# Patient Record
Sex: MALE | Race: BLACK OR AFRICAN AMERICAN | NOT HISPANIC OR LATINO | URBAN - METROPOLITAN AREA
[De-identification: names, ages, dates, MRNs, and addresses within clinical notes are randomized per-mention and may not be internally consistent; named-entity substitution may affect disease eponyms.]

---

## 2023-05-02 ENCOUNTER — OFFICE (AMBULATORY)
Dept: URBAN - METROPOLITAN AREA CLINIC 76 | Facility: CLINIC | Age: 48
End: 2023-05-02

## 2023-05-02 VITALS
OXYGEN SATURATION: 98 % | HEART RATE: 78 BPM | HEIGHT: 68 IN | SYSTOLIC BLOOD PRESSURE: 130 MMHG | WEIGHT: 224 LBS | DIASTOLIC BLOOD PRESSURE: 76 MMHG

## 2023-05-02 DIAGNOSIS — F11.90 OPIOID USE, UNSPECIFIED, UNCOMPLICATED: ICD-10-CM

## 2023-05-02 DIAGNOSIS — Z86.010 PERSONAL HISTORY OF COLONIC POLYPS: ICD-10-CM

## 2023-05-02 DIAGNOSIS — R14.0 ABDOMINAL DISTENSION (GASEOUS): ICD-10-CM

## 2023-05-02 DIAGNOSIS — R12 HEARTBURN: ICD-10-CM

## 2023-05-02 PROCEDURE — 99204 OFFICE O/P NEW MOD 45 MIN: CPT | Performed by: INTERNAL MEDICINE

## 2023-05-10 VITALS
DIASTOLIC BLOOD PRESSURE: 73 MMHG | HEART RATE: 89 BPM | SYSTOLIC BLOOD PRESSURE: 150 MMHG | HEART RATE: 87 BPM | TEMPERATURE: 96.8 F | HEIGHT: 68 IN | SYSTOLIC BLOOD PRESSURE: 108 MMHG | DIASTOLIC BLOOD PRESSURE: 98 MMHG | HEART RATE: 84 BPM | HEART RATE: 88 BPM | TEMPERATURE: 97.5 F | SYSTOLIC BLOOD PRESSURE: 140 MMHG | RESPIRATION RATE: 23 BRPM | OXYGEN SATURATION: 98 % | HEART RATE: 85 BPM | RESPIRATION RATE: 15 BRPM | SYSTOLIC BLOOD PRESSURE: 114 MMHG | TEMPERATURE: 96.9 F | WEIGHT: 224 LBS | HEART RATE: 86 BPM | SYSTOLIC BLOOD PRESSURE: 118 MMHG | OXYGEN SATURATION: 99 % | SYSTOLIC BLOOD PRESSURE: 139 MMHG | SYSTOLIC BLOOD PRESSURE: 125 MMHG | RESPIRATION RATE: 20 BRPM | DIASTOLIC BLOOD PRESSURE: 78 MMHG | DIASTOLIC BLOOD PRESSURE: 99 MMHG | SYSTOLIC BLOOD PRESSURE: 113 MMHG | DIASTOLIC BLOOD PRESSURE: 67 MMHG | DIASTOLIC BLOOD PRESSURE: 76 MMHG | DIASTOLIC BLOOD PRESSURE: 72 MMHG | DIASTOLIC BLOOD PRESSURE: 71 MMHG | DIASTOLIC BLOOD PRESSURE: 80 MMHG | RESPIRATION RATE: 17 BRPM | DIASTOLIC BLOOD PRESSURE: 93 MMHG | SYSTOLIC BLOOD PRESSURE: 116 MMHG

## 2023-05-12 ENCOUNTER — AMBULATORY SURGICAL CENTER (AMBULATORY)
Dept: URBAN - METROPOLITAN AREA SURGERY 17 | Facility: SURGERY | Age: 48
End: 2023-05-12

## 2023-05-12 ENCOUNTER — OFFICE (AMBULATORY)
Dept: URBAN - METROPOLITAN AREA PATHOLOGY 4 | Facility: PATHOLOGY | Age: 48
End: 2023-05-12
Payer: COMMERCIAL

## 2023-05-12 DIAGNOSIS — D12.4 BENIGN NEOPLASM OF DESCENDING COLON: ICD-10-CM

## 2023-05-12 DIAGNOSIS — Z86.010 PERSONAL HISTORY OF COLONIC POLYPS: ICD-10-CM

## 2023-05-12 PROBLEM — K63.5 POLYP OF COLON: Status: ACTIVE | Noted: 2023-05-12

## 2023-05-12 LAB
GI HISTOLOGY: A. UNSPECIFIED: (no result)
GI HISTOLOGY: PDF REPORT: (no result)

## 2023-05-12 PROCEDURE — 45385 COLONOSCOPY W/LESION REMOVAL: CPT | Mod: 33 | Performed by: INTERNAL MEDICINE

## 2023-05-12 PROCEDURE — 88305 TISSUE EXAM BY PATHOLOGIST: CPT | Performed by: INTERNAL MEDICINE

## 2023-05-12 NOTE — SERVICEHPINOTES
ELI COVINGTON  is a  47  male   who presents today for a  Colonoscopy   for   the indications listed below. The updated Patient Profile was reviewed prior to the procedure, in conjunction with the Physical Exam, including medical conditions, surgical procedures, medications, allergies, family history and social history. See Physical Exam time stamp below for date and time of HPI completion.Pre-operatively, I reviewed the indication(s) for the procedure, the risks of the procedure [including but not limited to: unexpected bleeding possibly requiring hospitalization and/or unplanned repeat procedures, perforation possibly requiring surgical treatment, missed lesions and complications of sedation/MAC (also explained by anesthesia staff)]. I have evaluated the patient for risks associated with the planned anesthesia and the procedure to be performed and find the patient an acceptable candidate for IV sedation.Multiple opportunities were provided for any questions or concerns, and all questions were answered satisfactorily before any anesthesia was administered. We will proceed with the planned procedure.br

## 2024-01-18 ENCOUNTER — OFFICE VISIT (OUTPATIENT)
Dept: FAMILY MEDICINE CLINIC | Facility: CLINIC | Age: 49
End: 2024-01-18
Payer: COMMERCIAL

## 2024-01-18 VITALS
HEIGHT: 68 IN | RESPIRATION RATE: 16 BRPM | SYSTOLIC BLOOD PRESSURE: 110 MMHG | WEIGHT: 220.2 LBS | BODY MASS INDEX: 33.37 KG/M2 | HEART RATE: 84 BPM | OXYGEN SATURATION: 96 % | DIASTOLIC BLOOD PRESSURE: 72 MMHG

## 2024-01-18 DIAGNOSIS — M51.36 DDD (DEGENERATIVE DISC DISEASE), LUMBAR: Primary | ICD-10-CM

## 2024-01-18 DIAGNOSIS — E78.2 MIXED HYPERLIPIDEMIA: ICD-10-CM

## 2024-01-18 DIAGNOSIS — E11.65 UNCONTROLLED TYPE 2 DIABETES MELLITUS WITH HYPERGLYCEMIA: ICD-10-CM

## 2024-01-18 PROBLEM — Z86.010 HISTORY OF COLONIC POLYPS: Status: ACTIVE | Noted: 2024-01-18

## 2024-01-18 PROBLEM — Z86.0100 HISTORY OF COLONIC POLYPS: Status: ACTIVE | Noted: 2024-01-18

## 2024-01-18 PROBLEM — E11.9 DIABETES MELLITUS: Status: ACTIVE | Noted: 2024-01-18

## 2024-01-18 PROBLEM — E55.9 VITAMIN D DEFICIENCY: Status: ACTIVE | Noted: 2019-07-17

## 2024-01-18 PROBLEM — N52.8 OTHER MALE ERECTILE DYSFUNCTION: Status: ACTIVE | Noted: 2020-02-25

## 2024-01-18 PROBLEM — E78.5 HLD (HYPERLIPIDEMIA): Status: ACTIVE | Noted: 2018-05-21

## 2024-01-18 PROBLEM — E29.1 HYPOGONADISM MALE: Status: ACTIVE | Noted: 2018-05-21

## 2024-01-18 PROCEDURE — 99204 OFFICE O/P NEW MOD 45 MIN: CPT

## 2024-01-18 RX ORDER — ICOSAPENT ETHYL 1000 MG/1
2 CAPSULE ORAL 2 TIMES DAILY WITH MEALS
Qty: 120 CAPSULE | Refills: 5 | Status: SHIPPED | OUTPATIENT
Start: 2024-01-18

## 2024-01-18 RX ORDER — TADALAFIL 20 MG/1
1 TABLET ORAL DAILY PRN
COMMUNITY
Start: 2023-12-26 | End: 2024-01-25

## 2024-01-18 RX ORDER — ICOSAPENT ETHYL 1000 MG/1
2 CAPSULE ORAL
COMMUNITY
Start: 2023-01-03 | End: 2024-01-18 | Stop reason: SDUPTHER

## 2024-01-18 RX ORDER — HYDROCODONE BITARTRATE AND ACETAMINOPHEN 7.5; 325 MG/1; MG/1
1 TABLET ORAL 3 TIMES DAILY
COMMUNITY
Start: 2023-12-29

## 2024-01-18 RX ORDER — INSULIN LISPRO 100 [IU]/ML
INJECTION, SOLUTION INTRAVENOUS; SUBCUTANEOUS
COMMUNITY
Start: 2023-09-08

## 2024-01-18 RX ORDER — INSULIN GLARGINE 300 U/ML
48 INJECTION, SOLUTION SUBCUTANEOUS DAILY
COMMUNITY
Start: 2023-10-05

## 2024-01-18 NOTE — PROGRESS NOTES
Chief Complaint  Establish Care (Refferal Cem Melendrez, pain medicine [phone]), Diabetes, Hyperlipidemia, and Back Pain    Subjective         History of Present Illness    John Prado Jr. 48 y.o. male presents today for a new patient appointment. He is here to establish care and is a new patient to me. I reviewed the PFSH recorded today by my MA/LPN staff.       The patient is requesting a pain management referral due to lumbar degenerative disc disease.  He would like to see Cem Melendrez.  He had a work-related injury in 2004.    He has Type 2 DM that is managed by [facility].  He has an appointment with Endocrinology today.  He was diagnosed at age 36 years.  He takes Tuojeo 48 Units daily and Humalog 18 Units for breakfast and lunch, and 22 Units with dinner.  The patient also takes Jardiance 25 mg once daily and Trulicity 3 mg once weekly.    Last hemoglobin A1c was 8.6% on 12/29/2023.  He monitors blood glucose at night and it averages 150-160 mg/dL. He reports being compliant with a low carbohydrate and no concentrated sweets diet.  I informed the patient his A1c goal is less than 7.0% to reduce the chance of long-term complications associated with diabetes.    Last diabetic eye exam was this morning at [facility].  He reports exam was normal other than a cataract of the left eye.    No current foot sores, blisters, or ulcers.  He gets toenails cut once per month at the spa.    He has mixed hyperlipidemia.  Endocrinology ordered a full set of labs last month.  The patient has an appointment to follow-up with them today for lab results and treatment recommendations.  Triglycerides were very high at 515, total cholesterol was elevated at 265, HDL was low at 43, and LDL cholesterol was noted to be unable to calculate.  The patient has a prior stated allergy to Atorvastatin.  He was prescribed Vascepa 2 g twice daily.  However, he has not taken Vascepa for  "two months.  I educated the patient today on the risks associated with a high level of triglycerides including pancreatitis and cardiovascular disease.  I strongly encouraged the patient to restart Vascepa immediately, strictly adhere to a low-cholesterol diet, and start exercising.  The patient is in agreement to restart Vascepa, stated he will start exercising, work on a low-cholesterol diet, and will follow-up with Endocrinology provider today for further recommendations.    He takes a fish oil capsule once daily and beet gummies.  He does not exercise.    He takes vitamin D daily.            Objective   Vital Signs:   /72   Pulse 84   Resp 16   Ht 172.7 cm (68\")   Wt 99.9 kg (220 lb 3.2 oz)   SpO2 96%   BMI 33.48 kg/m²      BMI is >= 30 and <35. (Class 1 Obesity). The following options were offered after discussion;: exercise counseling/recommendations and nutrition counseling/recommendations        Physical Exam  Vitals and nursing note reviewed.   Constitutional:       General: He is not in acute distress.     Appearance: He is well-developed. He is obese.   HENT:      Head: Normocephalic and atraumatic.   Eyes:      General: No scleral icterus.        Right eye: No discharge.         Left eye: No discharge.      Conjunctiva/sclera: Conjunctivae normal.      Pupils: Pupils are equal, round, and reactive to light.   Neck:      Thyroid: No thyromegaly.      Vascular: No carotid bruit.      Trachea: No tracheal deviation.   Cardiovascular:      Rate and Rhythm: Normal rate and regular rhythm.      Pulses: Normal pulses.      Heart sounds: Normal heart sounds. No murmur heard.     No gallop.   Pulmonary:      Effort: Pulmonary effort is normal. No respiratory distress.      Breath sounds: Normal breath sounds. No wheezing or rales.   Musculoskeletal:         General: Normal range of motion.      Cervical back: Normal range of motion and neck supple.   Skin:     General: Skin is warm.      Coloration: " Skin is not pale.      Findings: No erythema or rash.   Neurological:      Mental Status: He is alert and oriented to person, place, and time.      Motor: No abnormal muscle tone.      Coordination: Coordination normal.   Psychiatric:         Behavior: Behavior normal.         Thought Content: Thought content normal.         Judgment: Judgment normal.          The following data was reviewed by: PATO Torres on 01/18/2024:  HEMOGLOBIN A1C (12/29/2023 09:43)   LIPID PANEL (12/29/2023 09:43)   COMPREHENSIVE METABOLIC PANEL (12/29/2023 09:43)  PSA DIAGNOSTIC (12/29/2023 09:43)  VITAMIN D,25-HYDROXY (12/29/2023 09:43)  T4, FREE (09/22/2023 09:39)  TSH (09/22/2023 09:39)  CBC AND DIFFERENTIAL (09/22/2023 09:38)  MicroAlbumin, Urine, Random - (09/22/2023 09:38)                 Assessment and Plan      Diagnoses and all orders for this visit:    1. DDD (degenerative disc disease), lumbar (Primary)  Comments:  New patient  Referral to pain management for further treatment  Orders:  -     Ambulatory Referral to Pain Management    2. Uncontrolled type 2 diabetes mellitus with hyperglycemia  Comments:  New patient  A1c above goal at 8.6%  Continue insulins, Jardiance  Low-carb/no sweets diet, exercise  Follow-up with Endocrinology today    3. Mixed hyperlipidemia  Comments:  Elevated, trigs high  Restart Vascepa today  Strict low-cholesterol diet, exercise, weight loss  F/U with Endocrinology today  Follow-up in 6 months  Orders:  -     icosapent ethyl (VASCEPA) 1 g capsule capsule; Take 2 g by mouth 2 (Two) Times a Day With Meals.  Dispense: 120 capsule; Refill: 5            Follow Up     Return in about 6 months (around 7/18/2024) for Next scheduled follow up.    Patient was given instructions and counseling regarding his condition or for health maintenance advice. Please see specific information pulled into the AVS if appropriate.

## 2024-01-23 ENCOUNTER — PRIOR AUTHORIZATION (OUTPATIENT)
Dept: FAMILY MEDICINE CLINIC | Facility: CLINIC | Age: 49
End: 2024-01-23
Payer: COMMERCIAL

## 2024-07-22 ENCOUNTER — OFFICE VISIT (OUTPATIENT)
Dept: FAMILY MEDICINE CLINIC | Facility: CLINIC | Age: 49
End: 2024-07-22
Payer: COMMERCIAL

## 2024-07-22 VITALS
HEIGHT: 68 IN | HEART RATE: 96 BPM | SYSTOLIC BLOOD PRESSURE: 116 MMHG | TEMPERATURE: 98 F | OXYGEN SATURATION: 95 % | BODY MASS INDEX: 34.04 KG/M2 | DIASTOLIC BLOOD PRESSURE: 76 MMHG | WEIGHT: 224.6 LBS

## 2024-07-22 DIAGNOSIS — Z23 NEED FOR VACCINATION: ICD-10-CM

## 2024-07-22 DIAGNOSIS — E11.65 UNCONTROLLED TYPE 2 DIABETES MELLITUS WITH HYPERGLYCEMIA: Primary | ICD-10-CM

## 2024-07-22 DIAGNOSIS — E78.2 MIXED HYPERLIPIDEMIA: ICD-10-CM

## 2024-07-22 PROCEDURE — 99214 OFFICE O/P EST MOD 30 MIN: CPT

## 2024-07-22 PROCEDURE — 90471 IMMUNIZATION ADMIN: CPT

## 2024-07-22 PROCEDURE — 90472 IMMUNIZATION ADMIN EACH ADD: CPT

## 2024-07-22 PROCEDURE — 90677 PCV20 VACCINE IM: CPT

## 2024-07-22 PROCEDURE — 90750 HZV VACC RECOMBINANT IM: CPT

## 2024-07-22 RX ORDER — GABAPENTIN 100 MG/1
100 CAPSULE ORAL 3 TIMES DAILY
COMMUNITY
Start: 2024-07-19

## 2024-07-22 RX ORDER — TRAZODONE HYDROCHLORIDE 50 MG/1
50 TABLET ORAL
COMMUNITY
Start: 2024-06-11

## 2024-07-22 RX ORDER — ICOSAPENT ETHYL 1 G/1
2 CAPSULE ORAL 2 TIMES DAILY WITH MEALS
Qty: 120 CAPSULE | Refills: 5 | Status: SHIPPED | OUTPATIENT
Start: 2024-07-22

## 2024-07-22 RX ORDER — DULAGLUTIDE 3 MG/.5ML
3 INJECTION, SOLUTION SUBCUTANEOUS
COMMUNITY
Start: 2024-04-18 | End: 2024-10-15

## 2024-07-22 RX ORDER — EVOLOCUMAB 140 MG/ML
140 INJECTION, SOLUTION SUBCUTANEOUS
Qty: 2 ML | Refills: 5 | Status: SHIPPED | OUTPATIENT
Start: 2024-07-22 | End: 2024-07-25

## 2024-07-22 RX ORDER — EMPAGLIFLOZIN 25 MG/1
25 TABLET, FILM COATED ORAL DAILY
COMMUNITY
Start: 2024-07-08

## 2024-07-22 NOTE — ASSESSMENT & PLAN NOTE
Diabetes is improving with treatment but still above goal of less than 7.0%  Continue current treatment regimen.  Recommended an ADA diet.  Regular aerobic exercise.  Diabetes will be reassessed in 6 months  Follow up with Endocrinology at appointment tomorrow

## 2024-07-22 NOTE — PROGRESS NOTES
"Chief Complaint  Hyperlipidemia and Diabetes    Subjective          Hyperlipidemia      John Prado Jr. 49 y.o. male presents for medical management. Since the last visit, he has overall felt well. He has DMII managed by Endocrinologist and Hyperlipidemia with LDL above goal of less than 70 on Vascepa, and will add Repatha to lower cardiovascular risk . He has been compliant with current medications, and I have reviewed them. The patient denies medication side effects. Will refill medications. He is asymptomatic.    Mr. Prado has mixed hyperlipidemia. He is compliant with taking Vascepa daily. LDL was 149 on lipid panel last week. He is aware his LDL goal is less than 70 due to having diabetes.  He admits he does not always follow a low-cholesterol diet.    He is in agreement to continue Vascepa and add Repatha to reduce cardiovascular risk.    Mr. Prado has Type 2 DM. A1c was 9.8% last week. He is aware his A1c goal is less than 7.0% to reduce long-term complications. He has an appointment with Endocrinology tomorrow to discuss recent A1c result.    We discussed health maintenance today.  He is in agreement for pneumonia and Shingrix vaccinations.          Objective   Vital Signs:   /76 (BP Location: Left arm, Patient Position: Sitting, Cuff Size: Large Adult)   Pulse 96   Temp 98 °F (36.7 °C) (Oral)   Ht 172.7 cm (68\")   Wt 102 kg (224 lb 9.6 oz)   SpO2 95%   BMI 34.15 kg/m²      BMI is >= 30 and <35. (Class 1 Obesity). The following options were offered after discussion;: exercise counseling/recommendations and nutrition counseling/recommendations       Physical Exam  Vitals and nursing note reviewed.   Constitutional:       General: He is not in acute distress.     Appearance: He is well-developed. He is obese.   HENT:      Head: Normocephalic and atraumatic.   Eyes:      General: No scleral icterus.        Right eye: No discharge.         Left eye: No discharge.      Conjunctiva/sclera: " Conjunctivae normal.      Pupils: Pupils are equal, round, and reactive to light.   Neck:      Thyroid: No thyromegaly.      Vascular: No carotid bruit.      Trachea: No tracheal deviation.   Cardiovascular:      Rate and Rhythm: Normal rate and regular rhythm.      Pulses: Normal pulses.      Heart sounds: Normal heart sounds. No murmur heard.     No gallop.   Pulmonary:      Effort: Pulmonary effort is normal. No respiratory distress.      Breath sounds: Normal breath sounds. No wheezing or rales.   Musculoskeletal:         General: Normal range of motion.      Cervical back: Normal range of motion and neck supple.   Skin:     General: Skin is warm.      Coloration: Skin is not pale.      Findings: No erythema or rash.   Neurological:      Mental Status: He is alert and oriented to person, place, and time.      Motor: No abnormal muscle tone.      Coordination: Coordination normal.   Psychiatric:         Behavior: Behavior normal.         Thought Content: Thought content normal.         Judgment: Judgment normal.          The following data was reviewed by: PATO Torres on 07/22/2024:  LIPID PANEL (07/15/2024 10:11)   HEMOGLOBIN A1C (07/15/2024 10:11)                Assessment and Plan      Assessment & Plan  Mixed hyperlipidemia   Lipid abnormalities are elevated and above goal of less than 70    Plan:  Continue same medication/s without change.   and Begin taking the following medication/s; Repatha.      Discussed medication dosage, use, side effects, and goals of treatment in detail.    Counseled patient on lifestyle modifications to help control hyperlipidemia.   Cholesterol lowering dietary information shared with patient.  Advised patient to exercise for 150 minutes weekly. (30 minute brisk walk, 5 days a week for example)  Weight Loss encouraged    Patient Treatment Goals:   LDL goal is less than 70    Follow up in 6 months.  Uncontrolled type 2 diabetes mellitus with hyperglycemia  Diabetes is  improving with treatment but still above goal of less than 7.0%  Continue current treatment regimen.  Recommended an ADA diet.  Regular aerobic exercise.  Diabetes will be reassessed in 6 months  Follow up with Endocrinology at appointment tomorrow  Need for vaccination  PCV20 and Shingrix given today with no vaccine reactions      Orders Placed This Encounter   Procedures    Pneumococcal Conjugate Vaccine 20-Valent (PCV20)    Shingrix Vaccine     New Medications Ordered This Visit   Medications    icosapent ethyl (VASCEPA) 1 g capsule capsule     Sig: Take 2 g by mouth 2 (Two) Times a Day With Meals.     Dispense:  120 capsule     Refill:  5    Repatha solution prefilled syringe injection     Sig: Inject 1 mL under the skin into the appropriate area as directed Every 14 (Fourteen) Days for 180 days.     Dispense:  2 mL     Refill:  5              Follow Up     Return in about 6 months (around 1/22/2025) for Next scheduled follow up.    Patient was given instructions and counseling regarding his condition or for health maintenance advice. Please see specific information pulled into the AVS if appropriate.

## 2024-07-23 ENCOUNTER — PRIOR AUTHORIZATION (OUTPATIENT)
Dept: FAMILY MEDICINE CLINIC | Facility: CLINIC | Age: 49
End: 2024-07-23
Payer: COMMERCIAL

## 2024-07-23 NOTE — TELEPHONE ENCOUNTER
John Prado (Key: [id number]) - [id number]  Repatha 140MG/ML syringes  Status: PA RequestCreated: July 22nd, 2024 [phone]: July 23rd, 2024

## 2024-07-23 NOTE — PROGRESS NOTES
Injection  Injection performed in ld by Jessica Thompson MA. Patient tolerated the procedure well without complications.  07/23/24   Jessica Thompson MA       Injection  Injection performed in rd by Jessica Thompson MA. Patient tolerated the procedure well without complications.  07/23/24   Jessica Thompson MA

## 2024-07-26 ENCOUNTER — PRIOR AUTHORIZATION (OUTPATIENT)
Dept: FAMILY MEDICINE CLINIC | Facility: CLINIC | Age: 49
End: 2024-07-26
Payer: COMMERCIAL

## 2024-07-28 RX ORDER — EVOLOCUMAB 140 MG/ML
140 INJECTION, SOLUTION SUBCUTANEOUS
Qty: 2 ML | Refills: 5 | Status: SHIPPED | OUTPATIENT
Start: 2024-07-28 | End: 2025-01-24

## 2024-10-16 NOTE — ASSESSMENT & PLAN NOTE
Lipid abnormalities are elevated and above goal of less than 70    Plan:  Continue same medication/s without change.   and Begin taking the following medication/s; Repatha.      Discussed medication dosage, use, side effects, and goals of treatment in detail.    Counseled patient on lifestyle modifications to help control hyperlipidemia.   Cholesterol lowering dietary information shared with patient.  Advised patient to exercise for 150 minutes weekly. (30 minute brisk walk, 5 days a week for example)  Weight Loss encouraged    Patient Treatment Goals:   LDL goal is less than 70    Follow up in 6 months.   I spoke to Ramo and advised him that the application  has been approved. Kristine ran the insurance and [employer] which brought him down to a $0 copay here at [facility] pharmacy.

## 2025-01-22 ENCOUNTER — OFFICE VISIT (OUTPATIENT)
Dept: FAMILY MEDICINE CLINIC | Facility: CLINIC | Age: 50
End: 2025-01-22
Payer: COMMERCIAL

## 2025-01-22 VITALS
HEIGHT: 68 IN | BODY MASS INDEX: 32.92 KG/M2 | WEIGHT: 217.2 LBS | SYSTOLIC BLOOD PRESSURE: 116 MMHG | OXYGEN SATURATION: 97 % | DIASTOLIC BLOOD PRESSURE: 80 MMHG | TEMPERATURE: 98.6 F | HEART RATE: 89 BPM

## 2025-01-22 DIAGNOSIS — E78.2 MIXED HYPERLIPIDEMIA: Primary | ICD-10-CM

## 2025-01-22 PROCEDURE — 99213 OFFICE O/P EST LOW 20 MIN: CPT

## 2025-01-22 RX ORDER — ICOSAPENT ETHYL 1 G/1
2 CAPSULE ORAL 2 TIMES DAILY WITH MEALS
Qty: 120 CAPSULE | Refills: 5 | Status: SHIPPED | OUTPATIENT
Start: 2025-01-22

## 2025-01-22 NOTE — PROGRESS NOTES
"Chief Complaint  Hyperlipidemia    Subjective          Hyperlipidemia      History of Present Illness      John Prado Jr. 49 y.o. male presents for medical management. Since the last visit, he has overall felt well. He has DMII managed by Endocrinologist and Hyperlipidemia with LDL above goal and will recheck a lipid panel.  He has been compliant with current medications, and I have reviewed them. The patient denies medication side effects. Will refill medications.     Mr. Prado has been compliant with taking Vascepa daily as directed.  Repatha injections were ordered in July 2024 due to LDL of 149.  The patient has Type II DM, so LDL goal is less than 70.  Diabetes is managed by Dr. Mariano, Endocrinology.  Last A1c was 8.9% in October 2024.  He has an upcoming Endo appointment on 3/4/2027.  He was unable to tolerate Atorvastatin previously due to headaches.  The Repatha was prescribed, but subsequently denied by insurance.  A lipid panel was redrawn in October 2024, and LDL did improve down to 117.  He has been eating a healthier diet, decreasing portion sizes, and has been successful in losing weight.  Will recheck a lipid panel to see if medication should be added to Vascepa therapy.  He is asymptomatic.             Objective   Vital Signs:   /80 (BP Location: Right arm, Patient Position: Sitting, Cuff Size: Large Adult)   Pulse 89   Temp 98.6 °F (37 °C) (Oral)   Ht 172.7 cm (68\")   Wt 98.5 kg (217 lb 3.2 oz)   SpO2 97%   BMI 33.03 kg/m²      BMI is >= 30 and <35. (Class 1 Obesity). The following options were offered after discussion;: exercise counseling/recommendations and nutrition counseling/recommendations       Physical Exam  Vitals and nursing note reviewed.   Constitutional:       General: He is not in acute distress.     Appearance: He is well-developed. He is obese.   HENT:      Head: Normocephalic and atraumatic.   Eyes:      General: No scleral icterus.     Conjunctiva/sclera: " Conjunctivae normal.      Pupils: Pupils are equal, round, and reactive to light.   Neck:      Vascular: No carotid bruit.      Trachea: No tracheal deviation.   Cardiovascular:      Rate and Rhythm: Normal rate and regular rhythm.      Pulses: Normal pulses.      Heart sounds: Normal heart sounds.   Pulmonary:      Effort: Pulmonary effort is normal. No respiratory distress.      Breath sounds: Normal breath sounds. No wheezing or rales.   Musculoskeletal:         General: Normal range of motion.      Cervical back: Normal range of motion and neck supple.   Skin:     General: Skin is warm.      Coloration: Skin is not pale.      Findings: No erythema or rash.   Neurological:      Mental Status: He is alert and oriented to person, place, and time.      Motor: No abnormal muscle tone.      Coordination: Coordination normal.   Psychiatric:         Mood and Affect: Mood normal.         Behavior: Behavior normal.        Physical Exam      The following data was reviewed by: PATO Torres on 01/22/2025:  LIPID PANEL (10/18/2024 11:29)   HEMOGLOBIN A1C (10/18/2024 11:29)       Results                 Assessment and Plan    Assessment & Plan      Assessment & Plan  Mixed hyperlipidemia  LDL above goal of <70 due to DM.  Repatha denied. Did not tolerate Atorvastatin due to headache.   Has been eating healthy and has lost weight.  Will recheck a lipid panel.  Continue a low-cholesterol diet, daily exercise, weight loss.  Follow up in 6 months.     Orders:    icosapent ethyl (VASCEPA) 1 g capsule capsule; Take 2 g by mouth 2 (Two) Times a Day With Meals.    Lipid panel             Follow Up     Return in about 6 months (around 7/22/2025) for Next scheduled follow up.    Patient was given instructions and counseling regarding his condition or for health maintenance advice. Please see specific information pulled into the AVS if appropriate.

## 2025-01-22 NOTE — ASSESSMENT & PLAN NOTE
LDL above goal of <70 due to DM.  Repatha denied. Did not tolerate Atorvastatin due to headache.   Has been eating healthy and has lost weight.  Will recheck a lipid panel.  Continue a low-cholesterol diet, daily exercise, weight loss.  Follow up in 6 months.     Orders:    icosapent ethyl (VASCEPA) 1 g capsule capsule; Take 2 g by mouth 2 (Two) Times a Day With Meals.    Lipid panel

## 2025-08-13 ENCOUNTER — OFFICE VISIT (OUTPATIENT)
Dept: FAMILY MEDICINE CLINIC | Facility: CLINIC | Age: 50
End: 2025-08-13
Payer: COMMERCIAL

## 2025-08-13 VITALS
HEART RATE: 82 BPM | OXYGEN SATURATION: 96 % | DIASTOLIC BLOOD PRESSURE: 66 MMHG | HEIGHT: 68 IN | SYSTOLIC BLOOD PRESSURE: 112 MMHG | BODY MASS INDEX: 33.13 KG/M2 | WEIGHT: 218.6 LBS | TEMPERATURE: 98.9 F

## 2025-08-13 DIAGNOSIS — Z23 NEED FOR VACCINATION: ICD-10-CM

## 2025-08-13 DIAGNOSIS — N52.8 OTHER MALE ERECTILE DYSFUNCTION: ICD-10-CM

## 2025-08-13 DIAGNOSIS — E11.65 UNCONTROLLED TYPE 2 DIABETES MELLITUS WITH HYPERGLYCEMIA: ICD-10-CM

## 2025-08-13 DIAGNOSIS — E78.2 MIXED HYPERLIPIDEMIA: Primary | ICD-10-CM

## 2025-08-13 RX ORDER — ROSUVASTATIN CALCIUM 40 MG/1
40 TABLET, COATED ORAL NIGHTLY
Qty: 90 TABLET | Refills: 1 | Status: SHIPPED | OUTPATIENT
Start: 2025-08-13

## 2025-08-13 RX ORDER — EMPAGLIFLOZIN 25 MG/1
25 TABLET, FILM COATED ORAL DAILY
Qty: 90 TABLET | Refills: 1 | Status: SHIPPED | OUTPATIENT
Start: 2025-08-13

## 2025-08-13 RX ORDER — GLIMEPIRIDE 4 MG/1
4 TABLET ORAL
COMMUNITY
Start: 2025-06-24 | End: 2025-12-21

## 2025-08-13 RX ORDER — TADALAFIL 20 MG/1
20 TABLET ORAL DAILY PRN
Qty: 30 TABLET | Refills: 2 | Status: SHIPPED | OUTPATIENT
Start: 2025-08-13

## 2025-08-13 RX ORDER — FENOFIBRATE 145 MG/1
145 TABLET, FILM COATED ORAL DAILY
Qty: 90 TABLET | Refills: 1 | Status: SHIPPED | OUTPATIENT
Start: 2025-08-13

## 2025-08-13 RX ORDER — ROSUVASTATIN CALCIUM 40 MG/1
40 TABLET, COATED ORAL DAILY
COMMUNITY
Start: 2025-03-05 | End: 2025-08-13 | Stop reason: SDUPTHER

## 2025-08-13 RX ORDER — FENOFIBRATE 145 MG/1
145 TABLET, FILM COATED ORAL DAILY
COMMUNITY
Start: 2025-06-24 | End: 2025-08-13 | Stop reason: SDUPTHER